# Patient Record
Sex: MALE | Race: BLACK OR AFRICAN AMERICAN | NOT HISPANIC OR LATINO | Employment: FULL TIME | URBAN - METROPOLITAN AREA
[De-identification: names, ages, dates, MRNs, and addresses within clinical notes are randomized per-mention and may not be internally consistent; named-entity substitution may affect disease eponyms.]

---

## 2024-04-24 DIAGNOSIS — I25.10 CORONARY ARTERY DISEASE INVOLVING NATIVE CORONARY ARTERY OF NATIVE HEART WITHOUT ANGINA PECTORIS: Primary | ICD-10-CM

## 2024-04-25 RX ORDER — EVOLOCUMAB 140 MG/ML
INJECTION, SOLUTION SUBCUTANEOUS
Qty: 2 ML | Refills: 0 | Status: SHIPPED | OUTPATIENT
Start: 2024-04-25

## 2024-04-26 DIAGNOSIS — E11.59 TYPE 2 DIABETES MELLITUS WITH OTHER CIRCULATORY COMPLICATION, WITHOUT LONG-TERM CURRENT USE OF INSULIN (HCC): Primary | ICD-10-CM

## 2024-04-26 RX ORDER — DAPAGLIFLOZIN 10 MG/1
10 TABLET, FILM COATED ORAL DAILY
Qty: 90 TABLET | Refills: 0 | Status: SHIPPED | OUTPATIENT
Start: 2024-04-26

## 2024-04-26 RX ORDER — LINACLOTIDE 145 UG/1
CAPSULE, GELATIN COATED ORAL
COMMUNITY
Start: 2024-04-24

## 2024-04-26 RX ORDER — SEMAGLUTIDE 0.68 MG/ML
INJECTION, SOLUTION SUBCUTANEOUS
COMMUNITY
Start: 2024-04-24

## 2024-04-26 RX ORDER — BENAZEPRIL HYDROCHLORIDE 10 MG/1
10 TABLET ORAL DAILY
COMMUNITY
Start: 2024-02-20

## 2024-04-26 RX ORDER — GLYBURIDE 2.5 MG/1
2.5 TABLET ORAL
COMMUNITY
Start: 2024-02-01

## 2024-04-26 RX ORDER — BLOOD SUGAR DIAGNOSTIC
STRIP MISCELLANEOUS
COMMUNITY
Start: 2024-04-25

## 2024-04-26 RX ORDER — LATANOPROST 50 UG/ML
SOLUTION/ DROPS OPHTHALMIC
COMMUNITY
Start: 2024-01-19

## 2024-04-26 RX ORDER — SWAB
SWAB, NON-MEDICATED MISCELLANEOUS
COMMUNITY

## 2024-04-26 RX ORDER — DAPAGLIFLOZIN 10 MG/1
10 TABLET, FILM COATED ORAL DAILY
COMMUNITY
Start: 2024-03-18

## 2024-05-13 DIAGNOSIS — E11.59 TYPE 2 DIABETES MELLITUS WITH OTHER CIRCULATORY COMPLICATION, WITHOUT LONG-TERM CURRENT USE OF INSULIN (HCC): Primary | ICD-10-CM

## 2024-05-23 DIAGNOSIS — I25.10 CORONARY ARTERY DISEASE INVOLVING NATIVE CORONARY ARTERY OF NATIVE HEART WITHOUT ANGINA PECTORIS: ICD-10-CM

## 2024-05-23 RX ORDER — EVOLOCUMAB 140 MG/ML
INJECTION, SOLUTION SUBCUTANEOUS
Qty: 2 ML | Refills: 0 | Status: SHIPPED | OUTPATIENT
Start: 2024-05-23

## 2024-06-22 LAB — HBA1C MFR BLD HPLC: 7.9 %

## 2024-06-24 ENCOUNTER — OFFICE VISIT (OUTPATIENT)
Age: 59
End: 2024-06-24

## 2024-06-24 VITALS
WEIGHT: 144 LBS | OXYGEN SATURATION: 98 % | HEART RATE: 77 BPM | DIASTOLIC BLOOD PRESSURE: 70 MMHG | HEIGHT: 61 IN | SYSTOLIC BLOOD PRESSURE: 111 MMHG | BODY MASS INDEX: 27.19 KG/M2

## 2024-06-24 DIAGNOSIS — Z11.4 SCREENING FOR HIV (HUMAN IMMUNODEFICIENCY VIRUS): ICD-10-CM

## 2024-06-24 DIAGNOSIS — M54.2 NECK PAIN: ICD-10-CM

## 2024-06-24 DIAGNOSIS — I25.10 CORONARY ARTERY DISEASE INVOLVING NATIVE CORONARY ARTERY OF NATIVE HEART WITHOUT ANGINA PECTORIS: ICD-10-CM

## 2024-06-24 DIAGNOSIS — Z11.59 NEED FOR HEPATITIS C SCREENING TEST: ICD-10-CM

## 2024-06-24 DIAGNOSIS — Z00.00 ANNUAL PHYSICAL EXAM: Primary | ICD-10-CM

## 2024-06-24 DIAGNOSIS — E78.00 HYPERCHOLESTEROLEMIA: ICD-10-CM

## 2024-06-24 DIAGNOSIS — E11.59 TYPE 2 DIABETES MELLITUS WITH OTHER CIRCULATORY COMPLICATION, WITHOUT LONG-TERM CURRENT USE OF INSULIN (HCC): ICD-10-CM

## 2024-06-24 PROBLEM — E11.9 DM2 (DIABETES MELLITUS, TYPE 2) (HCC): Status: ACTIVE | Noted: 2024-06-24

## 2024-06-24 PROBLEM — R56.9 SEIZURE (HCC): Status: ACTIVE | Noted: 2024-06-24

## 2024-06-24 PROBLEM — K21.9 GERD (GASTROESOPHAGEAL REFLUX DISEASE): Status: ACTIVE | Noted: 2024-06-24

## 2024-06-24 PROBLEM — J30.9 ALLERGIC RHINITIS: Status: ACTIVE | Noted: 2024-06-24

## 2024-06-24 PROBLEM — Z95.1: Status: ACTIVE | Noted: 2024-06-24

## 2024-06-24 PROCEDURE — 99396 PREV VISIT EST AGE 40-64: CPT | Performed by: FAMILY MEDICINE

## 2024-06-24 RX ORDER — OMEGA-3S/DHA/EPA/FISH OIL/D3 300MG-1000
400 CAPSULE ORAL DAILY
COMMUNITY

## 2024-06-24 RX ORDER — DICLOFENAC SODIUM 75 MG/1
75 TABLET, DELAYED RELEASE ORAL 2 TIMES DAILY PRN
Qty: 30 TABLET | Refills: 0 | Status: SHIPPED | OUTPATIENT
Start: 2024-06-24

## 2024-06-24 RX ORDER — DAPAGLIFLOZIN 10 MG/1
10 TABLET, FILM COATED ORAL DAILY
COMMUNITY

## 2024-06-24 RX ORDER — EVOLOCUMAB 140 MG/ML
140 INJECTION, SOLUTION SUBCUTANEOUS
COMMUNITY
End: 2024-06-24 | Stop reason: SDUPTHER

## 2024-06-24 RX ORDER — DEXLANSOPRAZOLE 30 MG/1
30 CAPSULE, DELAYED RELEASE ORAL DAILY
COMMUNITY
End: 2024-06-24 | Stop reason: CLARIF

## 2024-06-24 RX ORDER — GLYBURIDE 2.5 MG/1
2.5 TABLET ORAL DAILY
COMMUNITY

## 2024-06-24 RX ORDER — EVOLOCUMAB 140 MG/ML
140 INJECTION, SOLUTION SUBCUTANEOUS
Qty: 2 ML | Refills: 5 | Status: SHIPPED | OUTPATIENT
Start: 2024-06-24

## 2024-06-24 RX ORDER — ASPIRIN 81 MG/1
81 TABLET, CHEWABLE ORAL DAILY
COMMUNITY

## 2024-06-24 RX ORDER — LINACLOTIDE 145 UG/1
145 CAPSULE, GELATIN COATED ORAL DAILY
COMMUNITY

## 2024-06-24 RX ORDER — BENAZEPRIL HYDROCHLORIDE 10 MG/1
10 TABLET ORAL DAILY
COMMUNITY

## 2024-06-24 NOTE — PROGRESS NOTES
"Adult Annual Physical  Name: Jerrod Calixto      : 1965      MRN: [id number]  Encounter Provider: Mark Mauricio MD  Encounter Date: 2024   Encounter department: [facility]    Assessment & Plan   1. Annual physical exam  2. Type 2 diabetes mellitus with other circulatory complication, without long-term current use of insulin (HCC)  Assessment & Plan:  ADA diet, carb counting, low fat, high fiber intake. Water or low calorie drinks. Aerobic exercise. Weight loss. Continue farxiga, diabeta, metformin, ozempic.    No results found for: \"HGBA1C\"  Orders:  -     Basic metabolic panel; Future  -     Albumin / creatinine urine ratio; Future  -     Hemoglobin A1C ([facility]); Future  -     semaglutide, 0.25 or 0.5 mg/dose, (Ozempic, 0.25 or 0.5 MG/DOSE,) 2 mg/3 mL injection pen; Inject 0.75 mL (0.5 mg total) under the skin every 7 days  3. Coronary artery disease involving native coronary artery of native heart without angina pectoris  Assessment & Plan:  Has stent. Continue ASA, repatha, beta blocker.   4. Neck pain  -     diclofenac (VOLTAREN) 75 mg EC tablet; Take 1 tablet (75 mg total) by mouth 2 (two) times a day as needed (neck pain)  5. Hypercholesterolemia  Assessment & Plan:  Low cholesterol diet. Encourage vegetables, fruit, and whole grains. Exercise.  Continue statin.  Orders:  -     Evolocumab (Repatha) 140 MG/ML SOSY; Inject 1 mL (140 mg total) under the skin every 14 (fourteen) days  -     Lipid panel; Future  6. Screening for HIV (human immunodeficiency virus)  -     HIV 1/2 AG/AB w Reflex [facility] for 2 yr old and above; Future  7. Need for hepatitis C screening test  -     Hepatitis C Antibody; Future    Immunizations and preventive care screenings were discussed with patient today. Appropriate education was printed on patient's after visit summary.        Counseling:  Exercise: the importance of regular exercise/physical activity was discussed. Recommend " exercise 3-5 times per week for at least 30 minutes.       Depression Screening and Follow-up Plan: Patient was screened for depression during today's encounter. They screened negative with a PHQ-2 score of 1.        History of Present Illness     Adult Annual Physical:  Patient presents for annual physical.   Jerrod Calixto is here for annual exam. Denies chest pain, shortness of breath, headache, or visual symptoms. Reviewed and updated PMHx, PSHx, Family Hx, Allergies, and Meds.  Having pain and tingling on a side of his neck and shoulder. Had colonoscopy April 2024. Polyp taken out . Prostate enlarged. Saw urology. Checked PSA and had rectal. Tests ok. Tingling neck. Had labs done. Pending. .     Diet and Physical Activity:  - Diet/Nutrition: consuming 3-5 servings of fruits/vegetables daily and well balanced diet.  - Exercise: moderate cardiovascular exercise.    Depression Screening:  - PHQ-2 Score: 1    General Health:  - Sleep: 7-8 hours of sleep on average.  - Hearing: normal hearing right ear and normal hearing left ear.  - Vision: wears glasses and contacts.  - Dental: regular dental visits.     Health:  - History of STDs: no.   - Urinary symptoms: none and nocturia.     Advanced Care Planning:  - Has an advanced directive?: no    - Has a durable medical POA?: no    - ACP document given to patient?: no      Review of Systems   Constitutional:  Negative for fatigue.   Respiratory:  Negative for shortness of breath.    Cardiovascular:  Negative for chest pain.   Gastrointestinal:  Negative for abdominal pain, constipation and diarrhea.   Genitourinary:  Negative for dysuria.        Nocturia.   Musculoskeletal:  Positive for neck pain.   Neurological:  Positive for numbness (Neck). Negative for dizziness.   Diabetic Foot Exam    Patient's shoes and socks removed.    Right Foot/Ankle   Right Foot Inspection  Skin Exam: skin normal, skin intact and dry skin. No warmth, no callus, no erythema, no maceration,  "no abnormal color, no pre-ulcer, no ulcer and no callus.     Toe Exam: ROM and strength within normal limits.     Sensory   Monofilament testing: intact    Vascular  The right DP pulse is 2+.     Left Foot/Ankle  Left Foot Inspection  Skin Exam: skin normal, skin intact and dry skin. No warmth, no erythema, no maceration, normal color, no pre-ulcer, no ulcer and no callus.     Toe Exam: ROM and strength within normal limits.     Sensory   Monofilament testing: intact    Vascular  Capillary refills: < 3 seconds  The left DP pulse is 2+.     Assign Risk Category  No deformity present  No loss of protective sensation  No weak pulses  Risk: 0        Medical History Reviewed by provider this encounter:  Tobacco  Allergies  Meds  Problems  Med Hx  Surg Hx  Fam Hx         Objective     /70 (BP Location: Left arm, Patient Position: Sitting, Cuff Size: Standard)   Pulse 77   Ht 5' 1\" (1.549 m)   Wt 65.3 kg (144 lb)   SpO2 98%   BMI 27.21 kg/m²     Physical Exam  Vitals and nursing note reviewed.   Constitutional:       Appearance: He is well-developed.   HENT:      Head: Normocephalic.   Eyes:      Conjunctiva/sclera: Conjunctivae normal.   Cardiovascular:      Rate and Rhythm: Normal rate and regular rhythm.      Pulses: no weak pulses.           Dorsalis pedis pulses are 2+ on the right side and 2+ on the left side.   Pulmonary:      Breath sounds: Normal breath sounds.   Abdominal:      Palpations: Abdomen is soft.   Musculoskeletal:         General: No tenderness.      Cervical back: Neck supple.        Feet:    Feet:      Right foot:      Skin integrity: Dry skin present. No ulcer, skin breakdown, erythema, warmth or callus.      Left foot:      Skin integrity: Dry skin present. No ulcer, skin breakdown, erythema, warmth or callus.   Neurological:      Mental Status: He is alert.       Administrative Statements     "

## 2024-06-24 NOTE — ASSESSMENT & PLAN NOTE
"ADA diet, carb counting, low fat, high fiber intake. Water or low calorie drinks. Aerobic exercise. Weight loss. Continue farxiga, diabeta, metformin, ozempic.    No results found for: \"HGBA1C\"  "

## 2024-08-11 DIAGNOSIS — E11.59 TYPE 2 DIABETES MELLITUS WITH OTHER CIRCULATORY COMPLICATION, WITHOUT LONG-TERM CURRENT USE OF INSULIN (HCC): ICD-10-CM

## 2024-08-12 RX ORDER — DAPAGLIFLOZIN 10 MG/1
10 TABLET, FILM COATED ORAL DAILY
Qty: 90 TABLET | Refills: 0 | Status: SHIPPED | OUTPATIENT
Start: 2024-08-12

## 2024-09-06 DIAGNOSIS — E11.59 TYPE 2 DIABETES MELLITUS WITH OTHER CIRCULATORY COMPLICATION, WITHOUT LONG-TERM CURRENT USE OF INSULIN (HCC): Primary | ICD-10-CM

## 2024-09-06 RX ORDER — GLYBURIDE 2.5 MG/1
2.5 TABLET ORAL DAILY
Qty: 90 TABLET | Refills: 0 | Status: SHIPPED | OUTPATIENT
Start: 2024-09-06

## 2024-10-26 LAB
CREAT ?TM UR-SCNC: 85.4 UMOL/L
EXTERNAL EGFR: 90
EXTERNAL HIV SCREEN: NORMAL
HBA1C MFR BLD HPLC: 8.2 %
HCV AB SER-ACNC: NORMAL
MICROALBUMIN/CREAT UR: <29.9 MG/G{CREAT}

## 2024-10-29 ENCOUNTER — OFFICE VISIT (OUTPATIENT)
Age: 59
End: 2024-10-29
Payer: COMMERCIAL

## 2024-10-29 VITALS
SYSTOLIC BLOOD PRESSURE: 110 MMHG | RESPIRATION RATE: 18 BRPM | BODY MASS INDEX: 27.87 KG/M2 | OXYGEN SATURATION: 98 % | WEIGHT: 147.6 LBS | DIASTOLIC BLOOD PRESSURE: 66 MMHG | HEIGHT: 61 IN | HEART RATE: 63 BPM

## 2024-10-29 DIAGNOSIS — K21.9 GASTROESOPHAGEAL REFLUX DISEASE WITHOUT ESOPHAGITIS: ICD-10-CM

## 2024-10-29 DIAGNOSIS — E78.00 HYPERCHOLESTEROLEMIA: ICD-10-CM

## 2024-10-29 DIAGNOSIS — I25.10 CORONARY ARTERY DISEASE INVOLVING NATIVE CORONARY ARTERY OF NATIVE HEART WITHOUT ANGINA PECTORIS: ICD-10-CM

## 2024-10-29 DIAGNOSIS — E11.59 TYPE 2 DIABETES MELLITUS WITH OTHER CIRCULATORY COMPLICATION, WITHOUT LONG-TERM CURRENT USE OF INSULIN (HCC): Primary | ICD-10-CM

## 2024-10-29 PROCEDURE — 99214 OFFICE O/P EST MOD 30 MIN: CPT | Performed by: FAMILY MEDICINE

## 2024-10-29 RX ORDER — INSULIN GLARGINE 100 [IU]/ML
10 INJECTION, SOLUTION SUBCUTANEOUS DAILY
Qty: 3 ML | Refills: 0 | Status: SHIPPED | OUTPATIENT
Start: 2024-10-29

## 2024-10-29 NOTE — PROGRESS NOTES
Ambulatory Visit  Name: Jerrod Calixto      : 1965      MRN: [id number]  Encounter Provider: Mark Mauricio MD  Encounter Date: 10/29/2024   Encounter department: [facility]    Assessment & Plan  Type 2 diabetes mellitus with other circulatory complication, without long-term current use of insulin (HCC)  ADA diet, carb counting, low fat, high fiber intake. Water or low calorie drinks. Aerobic exercise. Weight loss. Continue, farxiga, metformin. Comment: D/C glyburide Start insulin. Insulin teaching in 2 weeks.   Lab Results   Component Value Date    HGBA1C 8.2 10/26/2024            Coronary artery disease involving native coronary artery of native heart without angina pectoris  Continue asa, beta blocker, repatha         Hypercholesterolemia  Low cholesterol diet. Encourage vegetables, fruit, and whole grains. Exercise.  Conrtinue repatha         Gastroesophageal reflux disease without esophagitis  Lifestyle changes recommended to reduce symptoms. Avoiding acidic foods, spicy foods, and high fat foods. Losing weight. Elevate head during sleep if needed.              History of Present Illness       Jerrod Calixto is here for chronic conditions f/u. Denies chest pain, shortness of breath, headache, or visual symptoms. Reviewed and updated PMHx, PSHx, Family Hx, Allergies, and Meds.  Eating healthy, walking, compliant with medication.  -Hemoglobin A1C:   Collection Time: 10/26/24 12:00 AM       Result                      Value             Ref Range           Hemoglobin A1C              8.2                              -Albumin / creatinine urine ratio:   Collection Time: 10/26/24 12:00 AM       Result                      Value             Ref Range           EXT Creatinine Urine        85.40                                 Alb/Creat Ratio             <29.9                            -Human Immunodeficiency Virus 1/2 Antigen / Antibody ( Fourth Generation) with Reflex  "Testing:   Collection Time: 10/26/24 12:00 AM       Result                      Value             Ref Range           HIV Screen                  Non-Reactive                     -Hepatitis C antibody:   Collection Time: 10/26/24 12:00 AM       Result                      Value             Ref Range           HEP C AB                    Non-Reactive                        FBS- 102  crea- 0.86  eGFR- 90  Total Chol- 171  LDL- 104          Review of Systems   Constitutional:  Negative for fatigue.   Respiratory:  Negative for shortness of breath.    Cardiovascular:  Negative for chest pain.   Gastrointestinal:  Negative for abdominal pain, constipation and diarrhea.   Genitourinary:  Negative for dysuria.   Neurological:  Negative for dizziness.           Objective     /66 (BP Location: Right arm, Patient Position: Sitting, Cuff Size: Standard)   Pulse 63   Resp 18   Ht 5' 1\" (1.549 m)   Wt 67 kg (147 lb 9.6 oz)   SpO2 98%   BMI 27.89 kg/m²     Physical Exam  Vitals and nursing note reviewed.   Constitutional:       Appearance: He is well-developed.   HENT:      Head: Normocephalic.   Eyes:      Conjunctiva/sclera: Conjunctivae normal.   Cardiovascular:      Rate and Rhythm: Normal rate and regular rhythm.   Pulmonary:      Breath sounds: Normal breath sounds.   Abdominal:      Palpations: Abdomen is soft.   Musculoskeletal:      Cervical back: Neck supple.   Neurological:      Mental Status: He is alert.         "

## 2024-10-29 NOTE — ASSESSMENT & PLAN NOTE
ADA diet, carb counting, low fat, high fiber intake. Water or low calorie drinks. Aerobic exercise. Weight loss. Continue, farxiga, metformin. Comment: D/C glyburide Start insulin. Insulin teaching in 2 weeks.   Lab Results   Component Value Date    HGBA1C 8.2 10/26/2024

## 2024-10-29 NOTE — ASSESSMENT & PLAN NOTE
Lifestyle changes recommended to reduce symptoms. Avoiding acidic foods, spicy foods, and high fat foods. Losing weight. Elevate head during sleep if needed.

## 2024-11-12 ENCOUNTER — OFFICE VISIT (OUTPATIENT)
Age: 59
End: 2024-11-12
Payer: COMMERCIAL

## 2024-11-12 VITALS
RESPIRATION RATE: 18 BRPM | HEIGHT: 61 IN | OXYGEN SATURATION: 98 % | DIASTOLIC BLOOD PRESSURE: 52 MMHG | SYSTOLIC BLOOD PRESSURE: 110 MMHG | BODY MASS INDEX: 28.09 KG/M2 | WEIGHT: 148.8 LBS | HEART RATE: 60 BPM

## 2024-11-12 DIAGNOSIS — Z79.4 TYPE 2 DIABETES MELLITUS WITH OTHER CIRCULATORY COMPLICATION, WITH LONG-TERM CURRENT USE OF INSULIN (HCC): Primary | ICD-10-CM

## 2024-11-12 DIAGNOSIS — E11.59 TYPE 2 DIABETES MELLITUS WITH OTHER CIRCULATORY COMPLICATION, WITH LONG-TERM CURRENT USE OF INSULIN (HCC): Primary | ICD-10-CM

## 2024-11-12 PROCEDURE — 99213 OFFICE O/P EST LOW 20 MIN: CPT | Performed by: FAMILY MEDICINE

## 2024-11-12 RX ORDER — PEN NEEDLE, DIABETIC 31 GX5/16"
NEEDLE, DISPOSABLE MISCELLANEOUS DAILY
Qty: 50 EACH | Refills: 0 | Status: SHIPPED | OUTPATIENT
Start: 2024-11-12

## 2024-11-12 NOTE — PROGRESS NOTES
Ambulatory Visit  Name: Jerrod Calixto      : 1965      MRN: [id number]  Encounter Provider: Mark Mauricio MD  Encounter Date: 2024   Encounter department: [facility]    Assessment & Plan  Type 2 diabetes mellitus with other circulatory complication, with long-term current use of insulin (HCC)  ADA diet, carb counting, low fat, high fiber intake. Water or low calorie drinks. Aerobic exercise. Weight loss. Insulin teaching. Will start 10 units of basaglar.   Lab Results   Component Value Date    HGBA1C 8.2 10/26/2024            History of Present Illness       Jerrod Calixto is here for insulin teaching. Denies chest pain, shortness of breath, headache, or visual symptoms. Reviewed and updated PMHx, PSHx, Family Hx, Allergies, and Meds.    -Hemoglobin A1C:   Collection Time: 10/26/24 12:00 AM       Result                      Value             Ref Range           Hemoglobin A1C              8.2                              -Albumin / creatinine urine ratio:   Collection Time: 10/26/24 12:00 AM       Result                      Value             Ref Range           EXT Creatinine Urine        85.40                                 Alb/Creat Ratio             <29.9                            -Human Immunodeficiency Virus 1/2 Antigen / Antibody ( Fourth Generation) with Reflex Testing:   Collection Time: 10/26/24 12:00 AM       Result                      Value             Ref Range           HIV Screen                  Non-Reactive                     -Hepatitis C antibody:   Collection Time: 10/26/24 12:00 AM       Result                      Value             Ref Range           HEP C AB                    Non-Reactive                              Review of Systems   Constitutional:  Negative for fatigue.   Respiratory:  Negative for shortness of breath.    Cardiovascular:  Negative for chest pain.   Gastrointestinal:  Negative for abdominal pain, constipation and  "diarrhea.   Genitourinary:  Negative for dysuria.   Neurological:  Negative for dizziness.           Objective     /52 (BP Location: Right arm, Patient Position: Sitting, Cuff Size: Standard)   Pulse 60   Resp 18   Ht 5' 1\" (1.549 m)   Wt 67.5 kg (148 lb 12.8 oz)   SpO2 98%   BMI 28.12 kg/m²     Physical Exam  Vitals and nursing note reviewed.   Constitutional:       Appearance: He is well-developed.   HENT:      Head: Normocephalic.   Eyes:      Conjunctiva/sclera: Conjunctivae normal.   Cardiovascular:      Rate and Rhythm: Normal rate and regular rhythm.   Pulmonary:      Breath sounds: Normal breath sounds.   Abdominal:      Palpations: Abdomen is soft.   Musculoskeletal:      Cervical back: Neck supple.   Neurological:      Mental Status: He is alert.         "

## 2024-11-12 NOTE — ASSESSMENT & PLAN NOTE
ADA diet, carb counting, low fat, high fiber intake. Water or low calorie drinks. Aerobic exercise. Weight loss. Insulin teaching. Will start 10 units of basaglar.   Lab Results   Component Value Date    HGBA1C 8.2 10/26/2024

## 2024-11-17 DIAGNOSIS — E11.59 TYPE 2 DIABETES MELLITUS WITH OTHER CIRCULATORY COMPLICATION, WITH LONG-TERM CURRENT USE OF INSULIN (HCC): Primary | ICD-10-CM

## 2024-11-17 DIAGNOSIS — Z79.4 TYPE 2 DIABETES MELLITUS WITH OTHER CIRCULATORY COMPLICATION, WITH LONG-TERM CURRENT USE OF INSULIN (HCC): Primary | ICD-10-CM

## 2024-11-18 RX ORDER — DAPAGLIFLOZIN 10 MG/1
10 TABLET, FILM COATED ORAL DAILY
Qty: 90 TABLET | Refills: 1 | Status: SHIPPED | OUTPATIENT
Start: 2024-11-18

## 2024-11-18 NOTE — TELEPHONE ENCOUNTER
Pt called to confirm refill request was received. He stated he only has enough medication for today, and is asking for a call back once refill is sent. Please advise, thank you.

## 2024-11-29 ENCOUNTER — TELEPHONE (OUTPATIENT)
Age: 59
End: 2024-11-29

## 2024-11-29 DIAGNOSIS — E11.59 TYPE 2 DIABETES MELLITUS WITH OTHER CIRCULATORY COMPLICATION, WITH LONG-TERM CURRENT USE OF INSULIN (HCC): Primary | ICD-10-CM

## 2024-11-29 DIAGNOSIS — Z79.4 TYPE 2 DIABETES MELLITUS WITH OTHER CIRCULATORY COMPLICATION, WITH LONG-TERM CURRENT USE OF INSULIN (HCC): Primary | ICD-10-CM

## 2024-11-29 DIAGNOSIS — I25.10 CORONARY ARTERY DISEASE INVOLVING NATIVE CORONARY ARTERY OF NATIVE HEART WITHOUT ANGINA PECTORIS: ICD-10-CM

## 2024-11-29 RX ORDER — METOPROLOL TARTRATE 25 MG/1
25 TABLET, FILM COATED ORAL EVERY 12 HOURS SCHEDULED
Qty: 180 TABLET | Refills: 1 | Status: SHIPPED | OUTPATIENT
Start: 2024-11-29

## 2024-11-29 RX ORDER — BENAZEPRIL HYDROCHLORIDE 10 MG/1
10 TABLET ORAL DAILY
Qty: 90 TABLET | Refills: 1 | Status: SHIPPED | OUTPATIENT
Start: 2024-11-29

## 2024-11-29 NOTE — TELEPHONE ENCOUNTER
benazepril (LOTENSIN) 10 mg tablet Take 10 mg by mouth daily    90 days supply      metoprolol tartrate (LOPRESSOR) 25 mg tablet Take 25 mg by mouth every 12 (twelve) hours   90 days supply        *[facility]*

## 2024-12-09 RX ORDER — NITROGLYCERIN 0.4 MG/1
0.4 TABLET SUBLINGUAL
COMMUNITY
Start: 2024-11-27

## 2024-12-10 ENCOUNTER — OFFICE VISIT (OUTPATIENT)
Age: 59
End: 2024-12-10
Payer: COMMERCIAL

## 2024-12-10 VITALS
DIASTOLIC BLOOD PRESSURE: 60 MMHG | RESPIRATION RATE: 18 BRPM | OXYGEN SATURATION: 98 % | HEIGHT: 61 IN | HEART RATE: 62 BPM | BODY MASS INDEX: 28.32 KG/M2 | SYSTOLIC BLOOD PRESSURE: 120 MMHG | WEIGHT: 150 LBS

## 2024-12-10 DIAGNOSIS — E78.00 HYPERCHOLESTEROLEMIA: ICD-10-CM

## 2024-12-10 DIAGNOSIS — I25.10 CORONARY ARTERY DISEASE INVOLVING NATIVE CORONARY ARTERY OF NATIVE HEART WITHOUT ANGINA PECTORIS: ICD-10-CM

## 2024-12-10 DIAGNOSIS — Z79.4 TYPE 2 DIABETES MELLITUS WITH OTHER CIRCULATORY COMPLICATION, WITH LONG-TERM CURRENT USE OF INSULIN (HCC): Primary | ICD-10-CM

## 2024-12-10 DIAGNOSIS — E11.59 TYPE 2 DIABETES MELLITUS WITH OTHER CIRCULATORY COMPLICATION, WITH LONG-TERM CURRENT USE OF INSULIN (HCC): Primary | ICD-10-CM

## 2024-12-10 PROBLEM — T46.6X5A ADVERSE REACTION TO HMG-COA REDUCTASE INHIBITOR: Status: ACTIVE | Noted: 2024-12-10

## 2024-12-10 PROCEDURE — 99214 OFFICE O/P EST MOD 30 MIN: CPT | Performed by: FAMILY MEDICINE

## 2024-12-10 NOTE — ASSESSMENT & PLAN NOTE
ADA diet, carb counting, low fat, high fiber intake. Water or low calorie drinks. Aerobic exercise. Weight loss. Recently started on insulin. Continue metformin and farxiga. Keep insulin same.    Lab Results   Component Value Date    HGBA1C 8.2 10/26/2024     Orders:  •  Basic metabolic panel; Future  •  Hemoglobin A1C; Future

## 2024-12-10 NOTE — ASSESSMENT & PLAN NOTE
Low cholesterol diet. Encourage vegetables, fruit, and whole grains. Exercise.  Unable to tolerate statin. On repatha.

## 2024-12-10 NOTE — PROGRESS NOTES
"Name: Jerrod Calixto      : 1965      MRN: [id number]  Encounter Provider: Mark Mauricio MD  Encounter Date: 12/10/2024   Encounter department: [facility]  :  Assessment & Plan  Type 2 diabetes mellitus with other circulatory complication, with long-term current use of insulin (HCC)  ADA diet, carb counting, low fat, high fiber intake. Water or low calorie drinks. Aerobic exercise. Weight loss. Recently started on insulin. Continue metformin and farxiga. Keep insulin same.    Lab Results   Component Value Date    HGBA1C 8.2 10/26/2024     Orders:  •  Basic metabolic panel; Future  •  Hemoglobin A1C; Future    Hypercholesterolemia  Low cholesterol diet. Encourage vegetables, fruit, and whole grains. Exercise.  Unable to tolerate statin. On repatha.       Coronary artery disease involving native coronary artery of native heart without angina pectoris  Continue repatha, retoprolol, NTG.               History of Present Illness       Jerrod Calixto is here for chronic conditions f/u. Recently started on insulin. Denies chest pain, shortness of breath, headache, or visual symptoms. Reviewed and updated PMHx, PSHx, Family Hx, Allergies, and Meds.  Sugars low hundreds. Feels better. Eating same. Walking. Tolerating insulin well.       Review of Systems   Constitutional:  Negative for fatigue.   Respiratory:  Negative for shortness of breath.    Cardiovascular:  Negative for chest pain.   Gastrointestinal:  Negative for abdominal pain, constipation and diarrhea.   Genitourinary:  Negative for dysuria.   Neurological:  Negative for dizziness.       Objective   /60 (BP Location: Right arm, Patient Position: Sitting, Cuff Size: Standard)   Pulse 62   Resp 18   Ht 5' 1\" (1.549 m)   Wt 68 kg (150 lb)   SpO2 98%   BMI 28.34 kg/m²      Physical Exam  Vitals and nursing note reviewed.   Constitutional:       Appearance: He is well-developed.   HENT:      Head: Normocephalic. "   Eyes:      Conjunctiva/sclera: Conjunctivae normal.   Cardiovascular:      Rate and Rhythm: Normal rate and regular rhythm.   Pulmonary:      Breath sounds: Normal breath sounds.   Abdominal:      Palpations: Abdomen is soft.   Musculoskeletal:      Cervical back: Neck supple.   Neurological:      Mental Status: He is alert.

## 2024-12-13 DIAGNOSIS — E78.00 HYPERCHOLESTEROLEMIA: ICD-10-CM

## 2024-12-13 RX ORDER — EVOLOCUMAB 140 MG/ML
INJECTION, SOLUTION SUBCUTANEOUS
Qty: 2 ML | Refills: 0 | Status: SHIPPED | OUTPATIENT
Start: 2024-12-13

## 2025-01-07 DIAGNOSIS — E78.00 HYPERCHOLESTEROLEMIA: ICD-10-CM

## 2025-01-09 RX ORDER — EVOLOCUMAB 140 MG/ML
1 INJECTION, SOLUTION SUBCUTANEOUS
Qty: 2 ML | Refills: 0 | Status: SHIPPED | OUTPATIENT
Start: 2025-01-09

## 2025-01-09 RX ORDER — EVOLOCUMAB 140 MG/ML
INJECTION, SOLUTION SUBCUTANEOUS
Qty: 2 ML | Refills: 0 | Status: SHIPPED | OUTPATIENT
Start: 2025-01-09 | End: 2025-01-09 | Stop reason: SDUPTHER

## 2025-01-20 DIAGNOSIS — E11.59 TYPE 2 DIABETES MELLITUS WITH OTHER CIRCULATORY COMPLICATION, WITHOUT LONG-TERM CURRENT USE OF INSULIN (HCC): Primary | ICD-10-CM

## 2025-01-20 RX ORDER — PEN NEEDLE, DIABETIC 32 GX 1/6"
NEEDLE, DISPOSABLE MISCELLANEOUS 3 TIMES DAILY
Qty: 100 EACH | Refills: 1 | Status: SHIPPED | OUTPATIENT
Start: 2025-01-20

## 2025-01-20 RX ORDER — PEN NEEDLE, DIABETIC 32 GX 1/6"
NEEDLE, DISPOSABLE MISCELLANEOUS
COMMUNITY
End: 2025-01-20 | Stop reason: SDUPTHER

## 2025-01-21 LAB
LEFT EYE DIABETIC RETINOPATHY: NORMAL
RIGHT EYE DIABETIC RETINOPATHY: NORMAL

## 2025-01-22 ENCOUNTER — TELEPHONE (OUTPATIENT)
Dept: ADMINISTRATIVE | Facility: OTHER | Age: 60
End: 2025-01-22

## 2025-01-22 NOTE — TELEPHONE ENCOUNTER
Upon review of the In Basket request we were able to locate, review, and update the patient chart as requested for eGFR.    Any additional questions or concerns should be emailed to the Practice Liaisons via the appropriate education email address, please do not reply via In Basket.    Thank you  Lam Ricks MA   [facility]

## 2025-01-22 NOTE — TELEPHONE ENCOUNTER
Upon review of the In Basket request we found Microalbumin Creatinine Ratio is up to date.    Any additional questions or concerns should be emailed to the Practice Liaisons via the appropriate education email address, please do not reply via In Basket.    Thank you  Lam Ricks MA   [facility]

## 2025-01-22 NOTE — TELEPHONE ENCOUNTER
----- Message from Nikki JEREZ sent at 1/21/2025 12:47 PM EST -----  Regarding: Care Gap Request- GFR, Urine Micro  01/21/25 12:47 PM    Hello, our patient Jerrod Calixto has had Glomerular Filtration Rate (GFR) and Urine Albumin/Creatinine Ratio completed/performed. Please assist in updating the patient chart by pulling the document from the Media Tab. The date of service is 10/26/2024.     Thank you,  Nikki Lopes MA  [facility]

## 2025-02-03 ENCOUNTER — TELEPHONE (OUTPATIENT)
Age: 60
End: 2025-02-03

## 2025-02-03 DIAGNOSIS — E78.00 HYPERCHOLESTEROLEMIA: ICD-10-CM

## 2025-02-03 DIAGNOSIS — Z79.4 TYPE 2 DIABETES MELLITUS WITH OTHER CIRCULATORY COMPLICATION, WITH LONG-TERM CURRENT USE OF INSULIN (HCC): Primary | ICD-10-CM

## 2025-02-03 DIAGNOSIS — I25.10 CORONARY ARTERY DISEASE INVOLVING NATIVE CORONARY ARTERY OF NATIVE HEART WITHOUT ANGINA PECTORIS: ICD-10-CM

## 2025-02-03 DIAGNOSIS — E11.59 TYPE 2 DIABETES MELLITUS WITH OTHER CIRCULATORY COMPLICATION, WITH LONG-TERM CURRENT USE OF INSULIN (HCC): Primary | ICD-10-CM

## 2025-02-03 NOTE — TELEPHONE ENCOUNTER
Patient called in and would like his labs printed out - If any other labs are needed for his appt on 2/11 please add that as well.     Patient will  a copy of the labs today -    Thank you

## 2025-02-07 LAB — HBA1C MFR BLD HPLC: 8.7 %

## 2025-02-11 ENCOUNTER — OFFICE VISIT (OUTPATIENT)
Age: 60
End: 2025-02-11
Payer: COMMERCIAL

## 2025-02-11 VITALS
WEIGHT: 152.4 LBS | BODY MASS INDEX: 28.77 KG/M2 | RESPIRATION RATE: 18 BRPM | HEART RATE: 58 BPM | SYSTOLIC BLOOD PRESSURE: 92 MMHG | HEIGHT: 61 IN | OXYGEN SATURATION: 96 % | DIASTOLIC BLOOD PRESSURE: 60 MMHG

## 2025-02-11 DIAGNOSIS — E11.59 TYPE 2 DIABETES MELLITUS WITH OTHER CIRCULATORY COMPLICATION, WITH LONG-TERM CURRENT USE OF INSULIN (HCC): Primary | ICD-10-CM

## 2025-02-11 DIAGNOSIS — I25.10 CORONARY ARTERY DISEASE INVOLVING NATIVE CORONARY ARTERY OF NATIVE HEART WITHOUT ANGINA PECTORIS: ICD-10-CM

## 2025-02-11 DIAGNOSIS — E78.00 HYPERCHOLESTEROLEMIA: ICD-10-CM

## 2025-02-11 DIAGNOSIS — Z79.4 TYPE 2 DIABETES MELLITUS WITH OTHER CIRCULATORY COMPLICATION, WITH LONG-TERM CURRENT USE OF INSULIN (HCC): Primary | ICD-10-CM

## 2025-02-11 DIAGNOSIS — K21.9 GASTROESOPHAGEAL REFLUX DISEASE WITHOUT ESOPHAGITIS: ICD-10-CM

## 2025-02-11 PROCEDURE — 99214 OFFICE O/P EST MOD 30 MIN: CPT | Performed by: FAMILY MEDICINE

## 2025-02-11 RX ORDER — INSULIN GLARGINE 100 [IU]/ML
14 INJECTION, SOLUTION SUBCUTANEOUS DAILY
COMMUNITY

## 2025-02-11 NOTE — PROGRESS NOTES
Name: Jerrod Calixto      : 1965      MRN: [id number]  Encounter Provider: Mark Mauricio MD  Encounter Date: 2025   Encounter department: [facility]  :  Assessment & Plan  Type 2 diabetes mellitus with other circulatory complication, with long-term current use of insulin (HCC)  ADA diet, carb counting, low fat, high fiber intake. Water or low calorie drinks. Aerobic exercise. Weight loss. Continue insulin, metformin, farxiga. Increase basaglar to 14.     Lab Results   Component Value Date    HGBA1C 8.7 2025     Orders:  •  Basic metabolic panel; Future  •  Hemoglobin A1C; Future    Coronary artery disease involving native coronary artery of native heart without angina pectoris  Continue ASA, repatha, metoprolol, NTG prn       Hypercholesterolemia  Low cholesterol diet. Encourage vegetables, fruit, and whole grains. Exercise.  Continue repatha.  Orders:  •  Lipid panel; Future    Gastroesophageal reflux disease without esophagitis  Lifestyle changes recommended to reduce symptoms. Avoiding acidic foods, spicy foods, and high fat foods. Losing weight. Elevate head during sleep if needed.                History of Present Illness     Jerrod Calixto is here for chronic conditions f/u. Pt. had labs done prior to today's visit which included Recent Results Eating fair, sugar cane. Walking at work. Compliant with medication. Sugar in low 100s  -Hm Diabetes Eye Exam:   Collection Time: 25  1:38 PM       Result                      Value             Ref Range           Right Eye Diabetic Ret*     None                                  Left Eye Diabetic Reti*     None                                   Collection Time: 25 12:00 AM       Result                      Value             Ref Range           Hemoglobin A1C              8.7                                   FBS- 89  crea- 0.92  eGFR- >90      Review of Systems   Constitutional:  Negative for fatigue.  "  Respiratory:  Negative for shortness of breath.    Cardiovascular:  Negative for chest pain.   Gastrointestinal:  Negative for abdominal pain, constipation and diarrhea.   Genitourinary:  Negative for dysuria.   Neurological:  Negative for dizziness.       Objective   BP 92/60 (BP Location: Right arm, Patient Position: Sitting, Cuff Size: Standard)   Pulse 58   Resp 18   Ht 5' 1\" (1.549 m)   Wt 69.1 kg (152 lb 6.4 oz)   SpO2 96%   BMI 28.80 kg/m²      Physical Exam  Vitals and nursing note reviewed.   Constitutional:       Appearance: He is well-developed.   HENT:      Head: Normocephalic.   Eyes:      Conjunctiva/sclera: Conjunctivae normal.   Cardiovascular:      Rate and Rhythm: Normal rate and regular rhythm.   Pulmonary:      Breath sounds: Normal breath sounds.   Abdominal:      Palpations: Abdomen is soft.   Musculoskeletal:      Cervical back: Neck supple.   Neurological:      Mental Status: He is alert.         "

## 2025-02-11 NOTE — ASSESSMENT & PLAN NOTE
ADA diet, carb counting, low fat, high fiber intake. Water or low calorie drinks. Aerobic exercise. Weight loss. Continue insulin, metformin, farxiga. Increase basaglar to 14.     Lab Results   Component Value Date    HGBA1C 8.7 02/07/2025     Orders:  •  Basic metabolic panel; Future  •  Hemoglobin A1C; Future

## 2025-02-11 NOTE — ASSESSMENT & PLAN NOTE
Low cholesterol diet. Encourage vegetables, fruit, and whole grains. Exercise.  Continue repatha.  Orders:  •  Lipid panel; Future

## 2025-02-24 DIAGNOSIS — E11.59 TYPE 2 DIABETES MELLITUS WITH OTHER CIRCULATORY COMPLICATION, WITH LONG-TERM CURRENT USE OF INSULIN (HCC): Primary | ICD-10-CM

## 2025-02-24 DIAGNOSIS — Z79.4 TYPE 2 DIABETES MELLITUS WITH OTHER CIRCULATORY COMPLICATION, WITH LONG-TERM CURRENT USE OF INSULIN (HCC): Primary | ICD-10-CM

## 2025-02-24 RX ORDER — INSULIN GLARGINE 100 [IU]/ML
14 INJECTION, SOLUTION SUBCUTANEOUS DAILY
Qty: 12.6 ML | Refills: 1 | Status: SHIPPED | OUTPATIENT
Start: 2025-02-24

## 2025-02-24 NOTE — TELEPHONE ENCOUNTER
Medication: Insulin Glargine Solostar (Basaglar KwikPen) 100 UNIT/ML SOPN     Dose/Frequency:     Inject 14 Units under the skin daily       Quantity: 14 units    Pharmacy: [id number] - [place], [place] - [address]     Office:   [x] PCP/Provider -   [] Speciality/Provider -     Does the patient have enough for 3 days?   [] Yes   [x] No - Send as HP to POD

## 2025-02-25 ENCOUNTER — TELEPHONE (OUTPATIENT)
Age: 60
End: 2025-02-25

## 2025-02-25 NOTE — TELEPHONE ENCOUNTER
Prior auth for [facility] via cover my meds for Basagla kwikpen 100unit/ML Pen injectors.      Key: [id number]  Patient last Name: Durga  : 1965      Please process

## 2025-02-27 NOTE — TELEPHONE ENCOUNTER
PA for [place] Martinezpen SUBMITTED to [facility]    via    []CMM-KEY:   [x]Surescripts-Case ID # [id number]   []Availity-Auth ID # NDC #   []Faxed to plan   []Other website   []Phone call Case ID #     []PA sent as URGENT    All office notes, labs and other pertaining documents and studies sent. Clinical questions answered. Awaiting determination from insurance company.     Turnaround time for your insurance to make a decision on your Prior Authorization can take 7-21 business days.

## 2025-02-28 NOTE — TELEPHONE ENCOUNTER
PA for Basaglar Kwik pen  APPROVED     Date(s) approved 1/28/25-2/27/2026    Case #    Patient advised by          []Field Dailieshart Message  []Phone call   []LMOM  [x]L/M to call office as no active Communication consent on file  []Unable to leave detailed message as VM not approved on Communication consent       Pharmacy advised by    [x]Fax  []Phone call    Specialty Pharmacy    []     Approval letter scanned into Media

## 2025-03-15 DIAGNOSIS — E78.00 HYPERCHOLESTEROLEMIA: ICD-10-CM

## 2025-03-17 RX ORDER — EVOLOCUMAB 140 MG/ML
INJECTION, SOLUTION SUBCUTANEOUS
Qty: 2 ML | Refills: 0 | Status: SHIPPED | OUTPATIENT
Start: 2025-03-17

## 2025-03-24 ENCOUNTER — TELEPHONE (OUTPATIENT)
Age: 60
End: 2025-03-24

## 2025-03-24 NOTE — TELEPHONE ENCOUNTER
Patient called to order a refill for his lancets. I do not see lancets on his medication list. Patient states these are for his machine, not the pen. Patient will stop by the office with the box to have these re-ordered.

## 2025-03-25 ENCOUNTER — TELEPHONE (OUTPATIENT)
Age: 60
End: 2025-03-25

## 2025-03-25 DIAGNOSIS — Z79.4 TYPE 2 DIABETES MELLITUS WITH OTHER CIRCULATORY COMPLICATION, WITH LONG-TERM CURRENT USE OF INSULIN (HCC): Primary | ICD-10-CM

## 2025-03-25 DIAGNOSIS — E11.59 TYPE 2 DIABETES MELLITUS WITH OTHER CIRCULATORY COMPLICATION, WITH LONG-TERM CURRENT USE OF INSULIN (HCC): Primary | ICD-10-CM

## 2025-03-25 NOTE — TELEPHONE ENCOUNTER
Onetouch Delica Lancets Fine 30G three times a day 100units Mark Mauricio MD    Please put in a couple of refills      [facility]

## 2025-03-26 RX ORDER — LANCETS 30 GAUGE
1 EACH MISCELLANEOUS 4 TIMES DAILY
Qty: 100 EACH | Refills: 3 | Status: SHIPPED | OUTPATIENT
Start: 2025-03-26

## 2025-04-29 ENCOUNTER — TELEPHONE (OUTPATIENT)
Age: 60
End: 2025-04-29

## 2025-04-29 DIAGNOSIS — E11.59 TYPE 2 DIABETES MELLITUS WITH OTHER CIRCULATORY COMPLICATION, WITH LONG-TERM CURRENT USE OF INSULIN (HCC): Primary | ICD-10-CM

## 2025-04-29 DIAGNOSIS — Z79.4 TYPE 2 DIABETES MELLITUS WITH OTHER CIRCULATORY COMPLICATION, WITH LONG-TERM CURRENT USE OF INSULIN (HCC): Primary | ICD-10-CM

## 2025-04-29 RX ORDER — BLOOD SUGAR DIAGNOSTIC
1 STRIP MISCELLANEOUS 3 TIMES DAILY
Qty: 300 STRIP | Refills: 1 | Status: SHIPPED | OUTPATIENT
Start: 2025-04-29

## 2025-04-29 NOTE — TELEPHONE ENCOUNTER
Patient called in for refill for ONE TOUCH ULTRA TEST STRIPS -100. Patient would like 3 boxes for 90 day supply sent to [facility].  Unable to send request as I did not see any information needed to send to refill team.

## 2025-05-14 DIAGNOSIS — E11.59 TYPE 2 DIABETES MELLITUS WITH OTHER CIRCULATORY COMPLICATION, WITH LONG-TERM CURRENT USE OF INSULIN (HCC): ICD-10-CM

## 2025-05-14 DIAGNOSIS — Z79.4 TYPE 2 DIABETES MELLITUS WITH OTHER CIRCULATORY COMPLICATION, WITH LONG-TERM CURRENT USE OF INSULIN (HCC): ICD-10-CM

## 2025-05-14 RX ORDER — DAPAGLIFLOZIN 10 MG/1
10 TABLET, FILM COATED ORAL DAILY
Qty: 90 TABLET | Refills: 1 | Status: SHIPPED | OUTPATIENT
Start: 2025-05-14

## 2025-06-14 LAB — HBA1C MFR BLD HPLC: 8.3 %

## 2025-06-16 ENCOUNTER — OFFICE VISIT (OUTPATIENT)
Age: 60
End: 2025-06-16
Payer: COMMERCIAL

## 2025-06-16 ENCOUNTER — RESULTS FOLLOW-UP (OUTPATIENT)
Age: 60
End: 2025-06-16

## 2025-06-16 VITALS
DIASTOLIC BLOOD PRESSURE: 68 MMHG | HEART RATE: 67 BPM | RESPIRATION RATE: 18 BRPM | HEIGHT: 61 IN | WEIGHT: 149.8 LBS | SYSTOLIC BLOOD PRESSURE: 120 MMHG | BODY MASS INDEX: 28.28 KG/M2 | OXYGEN SATURATION: 97 %

## 2025-06-16 DIAGNOSIS — Z79.4 TYPE 2 DIABETES MELLITUS WITH OTHER CIRCULATORY COMPLICATION, WITH LONG-TERM CURRENT USE OF INSULIN (HCC): Primary | ICD-10-CM

## 2025-06-16 DIAGNOSIS — E11.59 TYPE 2 DIABETES MELLITUS WITH OTHER CIRCULATORY COMPLICATION, WITH LONG-TERM CURRENT USE OF INSULIN (HCC): Primary | ICD-10-CM

## 2025-06-16 DIAGNOSIS — I25.10 CORONARY ARTERY DISEASE INVOLVING NATIVE CORONARY ARTERY OF NATIVE HEART WITHOUT ANGINA PECTORIS: ICD-10-CM

## 2025-06-16 DIAGNOSIS — E78.00 HYPERCHOLESTEROLEMIA: ICD-10-CM

## 2025-06-16 DIAGNOSIS — K21.9 GASTROESOPHAGEAL REFLUX DISEASE WITHOUT ESOPHAGITIS: ICD-10-CM

## 2025-06-16 PROBLEM — Z78.9 STATIN INTOLERANCE: Status: ACTIVE | Noted: 2025-06-16

## 2025-06-16 PROCEDURE — 99214 OFFICE O/P EST MOD 30 MIN: CPT | Performed by: FAMILY MEDICINE

## 2025-06-16 NOTE — PROGRESS NOTES
Name: Jerrod Calixto      : 1965      MRN: [id number]  Encounter Provider: Mark Mauricio MD  Encounter Date: 2025   Encounter department: [facility]  :  Assessment & Plan  Type 2 diabetes mellitus with other circulatory complication, with long-term current use of insulin (HCC)  ADA diet, carb counting, low fat, high fiber intake. Water or low calorie drinks. Aerobic exercise. Maintain healthy weight.  Continue basaglar, metformin, and farxiga.  Offered GLP1, but opts diet.    Lab Results   Component Value Date    HGBA1C 8.7 2025     Orders:  •  Ambulatory referral to Podiatry; Future  •  Basic metabolic panel; Future  •  Hemoglobin A1C; Future    Hypercholesterolemia  Low cholesterol diet. Encourage vegetables, fruit, and whole grains. Exercise.  Intolerant to statins. Continue repatha.  Orders:  •  Lipid panel; Future    Coronary artery disease involving native coronary artery of native heart without angina pectoris  Continue metoprolol, ASA, and repatha.       Gastroesophageal reflux disease without esophagitis  Lifestyle changes recommended to reduce symptoms. Avoiding acidic foods, spicy foods, and high fat foods. Losing weight. Elevate head during sleep if needed.                History of Present Illness     Jerrod Calixto is here for chronic conditions f/u. Denies chest pain, shortness of breath, headache, or visual symptoms. Reviewed and updated PMHx, PSHx, Family Hx, Allergies, and Meds.  Eating healthy, exercising, and compliant with medication.      Date- 25    FBS- 94  A1c- 8.3  crea- 0.95  eGFR- >90  Total Chol- 157  LDL- 102  AST - N       Review of Systems   Constitutional:  Negative for fatigue.   Respiratory:  Negative for shortness of breath.    Cardiovascular:  Negative for chest pain.   Gastrointestinal:  Negative for abdominal pain, constipation and diarrhea.   Genitourinary:  Negative for dysuria.   Neurological:  Negative for  "dizziness.       Objective   /68 (BP Location: Right arm, Patient Position: Sitting, Cuff Size: Standard)   Pulse 67   Resp 18   Ht 5' 1\" (1.549 m)   Wt 67.9 kg (149 lb 12.8 oz)   SpO2 97%   BMI 28.30 kg/m²      Physical Exam  Vitals and nursing note reviewed.   Constitutional:       Appearance: He is well-developed.   HENT:      Head: Normocephalic.     Eyes:      Conjunctiva/sclera: Conjunctivae normal.       Cardiovascular:      Rate and Rhythm: Normal rate and regular rhythm.   Pulmonary:      Breath sounds: Normal breath sounds.   Abdominal:      Palpations: Abdomen is soft.     Musculoskeletal:      Cervical back: Neck supple.     Neurological:      Mental Status: He is alert.         "

## 2025-06-16 NOTE — ASSESSMENT & PLAN NOTE
ADA diet, carb counting, low fat, high fiber intake. Water or low calorie drinks. Aerobic exercise. Maintain healthy weight.  Continue basaglar, metformin, and farxiga.  Offered GLP1, but opts diet.    Lab Results   Component Value Date    HGBA1C 8.7 02/07/2025     Orders:  •  Ambulatory referral to Podiatry; Future  •  Basic metabolic panel; Future  •  Hemoglobin A1C; Future

## 2025-06-16 NOTE — ASSESSMENT & PLAN NOTE
Low cholesterol diet. Encourage vegetables, fruit, and whole grains. Exercise.  Intolerant to statins. Continue repatha.  Orders:  •  Lipid panel; Future

## 2025-08-05 ENCOUNTER — OFFICE VISIT (OUTPATIENT)
Age: 60
End: 2025-08-05
Payer: COMMERCIAL

## 2025-08-05 VITALS
WEIGHT: 151.2 LBS | HEART RATE: 76 BPM | OXYGEN SATURATION: 97 % | BODY MASS INDEX: 28.55 KG/M2 | SYSTOLIC BLOOD PRESSURE: 120 MMHG | DIASTOLIC BLOOD PRESSURE: 70 MMHG | HEIGHT: 61 IN

## 2025-08-05 DIAGNOSIS — Z84.89 FAMILY HEALTH PROBLEM: ICD-10-CM

## 2025-08-05 DIAGNOSIS — Z02.71 DISABILITY EXAMINATION: Primary | ICD-10-CM

## 2025-08-05 PROCEDURE — 99213 OFFICE O/P EST LOW 20 MIN: CPT | Performed by: FAMILY MEDICINE

## 2025-08-05 PROCEDURE — 99913: CPT | Performed by: FAMILY MEDICINE

## 2025-08-05 RX ORDER — DEXAMETHASONE 0.75 MG/1
TABLET ORAL
COMMUNITY
Start: 2025-07-06

## 2025-08-05 RX ORDER — AZITHROMYCIN 250 MG/1
TABLET, FILM COATED ORAL
COMMUNITY
Start: 2025-07-30

## 2025-08-19 DIAGNOSIS — E11.59 TYPE 2 DIABETES MELLITUS WITH OTHER CIRCULATORY COMPLICATION, WITHOUT LONG-TERM CURRENT USE OF INSULIN (HCC): ICD-10-CM

## 2025-08-21 RX ORDER — PEN NEEDLE, DIABETIC 31 GX5/16"
NEEDLE, DISPOSABLE MISCELLANEOUS 3 TIMES DAILY
Qty: 100 EACH | Refills: 5 | Status: SHIPPED | OUTPATIENT
Start: 2025-08-21